# Patient Record
Sex: MALE | Race: WHITE
[De-identification: names, ages, dates, MRNs, and addresses within clinical notes are randomized per-mention and may not be internally consistent; named-entity substitution may affect disease eponyms.]

---

## 2019-01-05 ENCOUNTER — HOSPITAL ENCOUNTER (OUTPATIENT)
Dept: HOSPITAL 25 - ED | Age: 39
Discharge: HOME | End: 2019-01-05
Attending: SURGERY
Payer: COMMERCIAL

## 2019-01-05 VITALS — DIASTOLIC BLOOD PRESSURE: 94 MMHG | SYSTOLIC BLOOD PRESSURE: 143 MMHG

## 2019-01-05 DIAGNOSIS — I10: ICD-10-CM

## 2019-01-05 DIAGNOSIS — R19.7: ICD-10-CM

## 2019-01-05 DIAGNOSIS — R10.31: ICD-10-CM

## 2019-01-05 DIAGNOSIS — F17.210: ICD-10-CM

## 2019-01-05 DIAGNOSIS — R11.0: ICD-10-CM

## 2019-01-05 DIAGNOSIS — K35.80: Primary | ICD-10-CM

## 2019-01-05 LAB
ALBUMIN SERPL BCG-MCNC: 4.4 G/DL (ref 3.2–5.2)
ALBUMIN/GLOB SERPL: 1.5 {RATIO} (ref 1–3)
ALP SERPL-CCNC: 73 U/L (ref 34–104)
ALT SERPL W P-5'-P-CCNC: 55 U/L (ref 7–52)
AMYLASE SERPL-CCNC: 59 U/L (ref 29–103)
ANION GAP SERPL CALC-SCNC: 4 MMOL/L (ref 2–11)
APTT PPP: 32.8 SECONDS (ref 26–36.3)
AST SERPL-CCNC: 22 U/L (ref 13–39)
BASOPHILS # BLD AUTO: 0.1 10^3/UL (ref 0–0.2)
BUN SERPL-MCNC: 12 MG/DL (ref 6–24)
BUN/CREAT SERPL: 10.9 (ref 8–20)
CALCIUM SERPL-MCNC: 9.5 MG/DL (ref 8.6–10.3)
CHLORIDE SERPL-SCNC: 106 MMOL/L (ref 101–111)
EOSINOPHIL # BLD AUTO: 0.2 10^3/UL (ref 0–0.6)
GLOBULIN SER CALC-MCNC: 2.9 G/DL (ref 2–4)
GLUCOSE SERPL-MCNC: 82 MG/DL (ref 70–100)
HCO3 SERPL-SCNC: 27 MMOL/L (ref 22–32)
HCT VFR BLD AUTO: 44 % (ref 42–52)
HGB BLD-MCNC: 14.8 G/DL (ref 14–18)
INR PPP/BLD: 0.9 (ref 0.77–1.02)
LYMPHOCYTES # BLD AUTO: 1.5 10^3/UL (ref 1–4.8)
MAGNESIUM SERPL-MCNC: 2.1 MG/DL (ref 1.9–2.7)
MCH RBC QN AUTO: 30 PG (ref 27–31)
MCHC RBC AUTO-ENTMCNC: 34 G/DL (ref 31–36)
MCV RBC AUTO: 91 FL (ref 80–94)
MONOCYTES # BLD AUTO: 1.5 10^3/UL (ref 0–0.8)
NEUTROPHILS # BLD AUTO: 15.9 10^3/UL (ref 1.5–7.7)
NRBC # BLD AUTO: 0 10^3/UL
NRBC BLD QL AUTO: 0
PLATELET # BLD AUTO: 256 10^3/UL (ref 150–450)
POTASSIUM SERPL-SCNC: 4.3 MMOL/L (ref 3.5–5)
PROT SERPL-MCNC: 7.3 G/DL (ref 6.4–8.9)
RBC # BLD AUTO: 4.87 10^6/UL (ref 4–5.4)
RBC UR QL AUTO: (no result)
SODIUM SERPL-SCNC: 137 MMOL/L (ref 135–145)
WBC # BLD AUTO: 19.3 10^3/UL (ref 3.5–10.8)

## 2019-01-05 PROCEDURE — 36415 COLL VENOUS BLD VENIPUNCTURE: CPT

## 2019-01-05 PROCEDURE — 99285 EMERGENCY DEPT VISIT HI MDM: CPT

## 2019-01-05 PROCEDURE — 74177 CT ABD & PELVIS W/CONTRAST: CPT

## 2019-01-05 PROCEDURE — 86140 C-REACTIVE PROTEIN: CPT

## 2019-01-05 PROCEDURE — 85730 THROMBOPLASTIN TIME PARTIAL: CPT

## 2019-01-05 PROCEDURE — 81015 MICROSCOPIC EXAM OF URINE: CPT

## 2019-01-05 PROCEDURE — 88304 TISSUE EXAM BY PATHOLOGIST: CPT

## 2019-01-05 PROCEDURE — 83605 ASSAY OF LACTIC ACID: CPT

## 2019-01-05 PROCEDURE — 83735 ASSAY OF MAGNESIUM: CPT

## 2019-01-05 PROCEDURE — 80053 COMPREHEN METABOLIC PANEL: CPT

## 2019-01-05 PROCEDURE — 82150 ASSAY OF AMYLASE: CPT

## 2019-01-05 PROCEDURE — C1776 JOINT DEVICE (IMPLANTABLE): HCPCS

## 2019-01-05 PROCEDURE — 85025 COMPLETE CBC W/AUTO DIFF WBC: CPT

## 2019-01-05 PROCEDURE — 85610 PROTHROMBIN TIME: CPT

## 2019-01-05 PROCEDURE — 81003 URINALYSIS AUTO W/O SCOPE: CPT

## 2019-01-05 PROCEDURE — 83690 ASSAY OF LIPASE: CPT

## 2019-01-05 NOTE — ED
Abdominal Pain/Male





- HPI Summary


HPI Summary: 


Patient is a 37 y/o M presenting to ED with complaints of RLQ/right flank pain 

with nausea onset today at 0400/0430. Provider in room at 1020, patient's 

roommate/friend/former peyton Gonzalez accompanying patient. He states that he 

was awakened from sleep at 0400/0430 due to the pain. Pain is described as "

like someone shoved a ball in there and is holding it there". Patient also 

describes pain as a tightness when he is just sitting still, states that when 

he walks/stretches he experiences a sharp pain that radiates across his lower 

abdomen. Patient reports nausea but denies vomiting. In the room, he notes that 

he had diarrhea this morning and was feeling feverish. He states that he was 

experiencing chills last night. Patient believes that he is not dehydrated. He 

had coffee at 0730/0800 this morning, states that last solid food was last 

night. Patient reports no episodes of similar pain. He states that he has never 

had a kidney stone, denies hematuria. Patient denies radiation of pain to 

genitalia. 





He has not taken any medications for the pain. Patient is on Lisinopril for 

HTN. No allergies reported. No PSHx. Patient smokes 1/2-3/4 packs cigarettes 

daily, reports occasional alcohol consumption, no substance usage. Patient 

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 

FMHx of mother with HTN. On triage, pain is rated 7/10, nothing is noted to 

aggravate/alleviate Sx. Home medications and allergies are reviewed. 





 Home Medications





Lisinopril 20 mg PO DAILY 01/05/19 [History Confirmed 01/05/19]





 Allergies











Allergy/AdvReac Type Severity Reaction Status Date / Time


 


No Known Allergies Allergy   Verified 01/05/19 10:09

















- History of Current Complaint


Chief Complaint: EDAbdPain


Stated Complaint: RIGHT SIDE ABD PAIN


Time Seen by Provider: 01/05/19 10:22


Hx Obtained From: Patient, Family/Caretaker - friend Lisa


Onset/Duration: Sudden Onset, Lasting Hours - pain onset 0400/0430, Still 

Present


Timing: Constant, Lasting Hours - pain onset 0400/0430


Severity Initially: Moderate


Severity Currently: Severe - 7/10


Pain Intensity: 7


Pain Scale Used: 0-10 Numeric - 7/10


Location: Discrete At: RLQ, Flank - right


Radiates: Yes


Radiates to: Flank - right


Character: Sharp - when moving/stretching, Other: - tightness when sitting


Aggravating Factor(s): Movement, Other: - stretching


Alleviating Factor(s): Nothing


Associated Signs And Symptoms: Positive: Fever - felt feverish, Nausea, Diarrhea

, Other - chills last night.  Negative: Constipation, Urinary Symptoms, Vomiting





- Allergies/Home Medications


Allergies/Adverse Reactions: 


 Allergies











Allergy/AdvReac Type Severity Reaction Status Date / Time


 


No Known Allergies Allergy   Verified 01/05/19 10:09











Home Medications: 


 Home Medications





Lisinopril 20 mg PO DAILY 01/05/19 [History Confirmed 01/05/19]











PMH/Surg Hx/FS Hx/Imm Hx


Previously Healthy: No


Cardiovascular History: Reports: Hx Hypertension


Sensory History: 


   Denies: Hx Legally Blind, Hx Deafness


Opthamlomology History: 


   Denies: Hx Legally Blind


EENT History: 


   Denies: Hx Deafness





- Surgical History


Surgery Procedure, Year, and Place: 01/05/19 - no surgical history reported


Infectious Disease History: No


Infectious Disease History: 


   Denies: Traveled Outside the [place] in Last 30 Days





- Family History


Known Family History: Positive: Hypertension - mother 





- Social History


Occupation: Employed Full-time


Lives: Dormitory/Roommates


Alcohol Use: Occasionally


Hx Substance Use: No


Smoking Status (MU): Current Every Day Smoker - 1/2 - 3/4 packs daily





Review of Systems


Positive: Fever - patient reports that he felt feverish, temp on vitals noted 

to be 99.4 F , Chills - last night 


Cardiovascular: Negative


Respiratory: Negative


Positive: Abdominal Pain - RLQ , Diarrhea, Nausea, Other - NEGATIVE - 

CONSTIPATION .  Negative: Vomiting


Positive: flank pain - RIGHT.  Negative: hematuria


Musculoskeletal: Negative


Skin: Negative


Neurological: Negative


Psychological: Normal


All Other Systems Reviewed And Are Negative: Yes





Physical Exam





- Summary


Physical Exam Summary: 


Appearance: Well-appearing, moderate pain distress, well-nourished, walking 

slowly, afebrile  


Skin: Warm, color reflects adequate perfusion, dry


Head: Normal Head/Face inspection, atraumatic


Eyes: Conjunctiva clear


ENT: Normal inspection


Neck: Supple, no nodes, no JVD


Respiratory: Lungs clear, normal breath sounds, no respiratory distress


Cardio: RRR, No murmur, pulses normal, brisk capillary refill


Abdomen: Soft, RLQ tenderness with guarding, no rebound, nondistended 


Bowel sounds: Present 


Musculoskeletal: Strength Intact/ROM intact, no calf tenderness, no edema. No 

CVA tenderness


Psychological: Normal


Neuro: Alert, muscle tone normal, no focal deficit








Triage Information Reviewed: Yes


Vital Signs On Initial Exam: 


 Initial Vitals











Temp Pulse Resp BP Pulse Ox


 


 99.4 F   86   15   132/97   99 


 


 01/05/19 10:07  01/05/19 10:07  01/05/19 10:07  01/05/19 10:07  01/05/19 10:07











Vital Signs Reviewed: Yes





Diagnostics





- Vital Signs


 Vital Signs











  Temp Pulse Resp BP Pulse Ox


 


 01/05/19 10:07  99.4 F  86  15  132/97  99














- Laboratory


Result Diagrams: 


 01/05/19 10:54





 01/05/19 10:54


Lab Statement: Any lab studies that have been ordered have been reviewed, and 

results considered in the medical decision making process.





- CT


  ** CT ADB/PEL


CT Interpretation Completed By: Radiologist


Summary of CT Findings: CT ABD/PEL IMPRESSION:  CT findings are consistent with 

early acute appendicitis.  THIS REPORT WAS REVIWED BY ED PHYSICIAN.





Re-Evaluation





- Re-Evaluation


  ** First Eval


Re-Evaluation Time: 11:45


Change: Worse


Comment: Patient states that he has been shivering, rates pain 7-8/10 at 

present. He reports no episodes of vomiting.





  ** Second Eval


Re-Evaluation Time: 12:42


Change: Unchanged


Comment: Patient is agreeable with morphine at this time. Pain is unchanged.





  ** Third Eval


Re-Evaluation Time: 14:03


Change: Improved


Comment: Informed of appendicitis. Patient's pain is controlled, patient 

advised to remain NPO.





  ** Fourth Eval


Re-Evaluation Time: 16:05


Change: Unchanged


Comment: Dr. Jhaveri has not arrived in ED yet, was in the OR when initially 

contacted. He will be contacted once more. This was relayed to patient. Pain 

remains controlled.





Abdominal Pain Fem Course/Dx





- Course


Course Of Treatment: Patient is a 37 y/o M presenting to ED with complaints of 

RLQ/right flank pain with nausea onset today at 0400/0430. Provider in room at 

1020, patient's roommate/friend/former peyton Gonzalez accompanying patient. He 

states that he was awakened from sleep at 0400/0430 due to the pain. Pain is 

described as "like someone shoved a ball in there and is holding it there". 

Patient also describes pain as a tightness when he is just sitting still, 

states that when he walks/stretches he experiences a sharp pain that radiates 

across his lower abdomen. Patient reports nausea but denies vomiting. In the 

room, he notes that he had diarrhea this morning and was feeling feverish. He 

states that he was experiencing chills last night. Patient believes that he is 

not dehydrated. He had coffee at 0730/0800 this morning, states that last solid 

food was last night. Patient reports no episodes of similar pain. He states 

that he has never had a kidney stone, denies hematuria. Patient denies 

radiation of pain to genitalia.  He has not taken any medications for the pain. 

Patient is on Lisinopril for HTN. No PSHx. On physical exam, moderate pain 

distress, patient walks slowly, tenderness at RLQ, soft, + guarding, no 

rebound. Bowel sounds are present, no CVA tenderness.  CT ABD/PEL IMPRESSION:  

CT findings are consistent with early acute appendicitis.  Labs showed WBC 19.3

, absolute neuts 15.9, absolute monos 1.5, ALT 55, AST 22, Alk Phos 73, CRP 11, 

amylase 59, lipase 29, lactic acid 0.9 . UA showed 1+ blood, 3+ RBC, absent 

bacteria, negative glucose.  During ED course, patient received fluids, Zofran 

4 mg IV ED ONCE, Morphine 8 mg IV ED ONCE ONE.  Patient's case was discussed 

with Dr. Jhaveri at 1400, Dr. Jhaveri is in the OR at this time, and will come to 

ED when able, to evaluate patient. Patient to be started on piperacillin sod/

tazobactam sod 3.375 gm in sodium chloride 100 mls @ 200 mls/hr IVPB ED ONCE 

ONE. 1630 - At this point, patient had been evaluate and was accepted by Dr. Jhaveri to OR.  Dx of acute appendicitis.





- Diagnoses


Differential Diagnosis/HQI/PQRI: Appendicitis, Gall Bladder Disease, 

Pancreatitis, Ureteral Stone, Urinary Tract Infection


Provider Diagnoses: 


 Acute appendicitis








- Provider Notifications


Discussed Care Of Patient With: Ancelmo Jhaveri


Time Discussed With Above Provider: 14:00


Instructed by Provider To: Admit As Inpatient - Patient's case was discussed 

with Dr. Jhaveri at 1400, when Dr. Jhaveri was in the OR. Dr. Jhaveri will come to ED 

to evaluate patient. Patient to be started on piperacillin sod/tazobactam sod 

3.375 gm in sodium chloride 100 mls @ 200 mls/hr IVPB ED ONCE ONE. 1630 - At 

this point, patient had been evaluated and was accepted by Dr. Jhaveri to OR.





Discharge





- Sign-Out/Discharge


Documenting (check all that apply): Patient Departure - admit 





- Discharge Plan


Condition: Stable


Disposition: ADMITTED TO [facility]





- Billing Disposition and Condition


Condition: STABLE


Disposition: Admitted to [facility]





- Attestation Statements


Document Initiated by Alo: Yes


Documenting Scribe: ANASTASIA HUANG 


Provider For Whom Alo is Documenting (Include Credential): GAVI MARCUS MD


Scribe Attestation: 


ANASTASIA ARRINGTON , scribed for GAVI MARCUS MD on 01/13/19 at 2134. 


Scribe Documentation Reviewed: Yes


Provider Attestation: 


The documentation as recorded by the ANASTASIA ko  accurately reflects 

the service I personally performed and the decisions made by me, GAVI MARCUS MD


Status of Scribe Document: Viewed

## 2019-01-05 NOTE — BRIEFOPN
Brief Operative Note





- Surgery


Procedures: 





Pre-OP Diagnoses:   acute appendicitis





Post-op Diagnosis:   same





Procedure:      Laparoscopic appendectomy





Surgeon:       Emilio





Asst:       none





Anethesia:       GETA  





EBL:      minimal





IVF:      1600cc crystalloid





Specimen:      appendix





Drains:      none

## 2019-01-05 NOTE — HP
CC:  [facility]; Dr. Yasmin Larose

 

HISTORY AND PHYSICAL:

 

DATE OF ADMISSION:  01/05/19

 

HISTORY OF PRESENT ILLNESS:  Mr. Resendiz is a 38-year-old gentleman who presents to [facility]
 with complaints of right upper quadrant pain since 4 a.m. this morning.  It is accompanied with carlos
sea, but without vomiting.  The patient does not describe any change in bowel habits.  He denies any 
fevers, but does endorse being cold.  He denies any previous similar symptoms.  He has decreased appe
tite. Pain is alleviated with lying still and aggravated with bending over.

 

PAST MEDICAL HISTORY:  Hypertension.

 

PAST SURGICAL HISTORY:  None.

 

MEDICATIONS:  Lisinopril.

 

ALLERGIES:  No known drug allergies.

 

FAMILY HISTORY:  Noncontributory for ulcerative colitis or Crohn disease.

 

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  He smokes half a pack a day of cigarettes.  Israel
es IV drug abuse.  He occasionally drinks alcohol.

 

REVIEW OF SYSTEMS:  No fevers.  No cardiovascular disease.  No cerebrovascular disease.  Abdominal co
mplaints as described.  No irritable bowel symptoms.  No dysuria.  No change in bowel habits.  No cla
udication-type symptoms.  The patient is obese.

 

                               PHYSICAL EXAMINATION

 

GENERAL:  He is alert and oriented x3.  He is in no apparent distress.

 

VITAL SIGNS:  T-max is 100.9, ______ is 100.1.

 

HEAD, EYES, EARS, NOSE, AND THROAT:  Normocephalic, atraumatic.  Sclerae anicteric. Mucous membranes 
are dry.

 

NECK:  No lymphadenopathy.

 

LUNGS:  Clear.

 

ABDOMEN:  Soft, nondistended, tender along the right flank and right lower quadrant.  No peritoneal s
igns.  No hernias or masses noted.  No groin hernias.

 

RECTAL:  Exam not performed.

 

EXTREMITIES:  Within normal limits.

 

 LABORATORY DATA/DIAGNOSTIC STUDIES:  Labs reviewed, white count is 19.3.  Metabolic panel shows a cr
eatinine of 1.1 and a CRP of 11.  Urinalysis shows rbc's.

 

CAT scan reviewed, ______ oral contrast.  The patient has what appears to be a retrocecal appendiciti
s with adjacent inflammatory changes, but no evidence of perforation.

 

IMPRESSION:  Acute appendicitis.  Recommended laparoscopic appendectomy.  I outlined the details of t
he procedure going over the risks, benefits, and alternatives.  Spending time discussing the possible
 alternatives of antibiotic use.  I do not recommend this, but it was discussed as a possibility.

 

We spoke about possible complications, which include but not limited to bleeding, infection, need for
 additional procedures or open procedures, possibility of injury to adjacent organs and the need for 
additional treatment and even transfer.  The patient's questions were answered, consent signed, he wa
s marked.

 

The patient has already received a dose of Zosyn.  We will take him urgently to the operating room fo
r a laparoscopic appendectomy.  He may possibly go home after the procedure depending on what we find
 intraoperatively.

 

 

 

[id number]/[facility] #: [id number]

## 2019-01-06 NOTE — OP
CC:  Dr. Larose; [facility].

 

OPERATIVE REPORT:

 

DATE OF OPERATION:  01/05/19.

 

DATE OF BIRTH:  [birth year].

 

SURGEON:  Ancelmo Jhaveri MD

 

ASSISTANT:  None.

 

ANESTHESIOLOGIST:  Dr. Bonner.

 

ANESTHESIA:  General.

 

PRE-OP DIAGNOSIS:  Acute appendicitis.

 

POST-OP DIAGNOSIS:  Acute appendicitis.

 

OPERATIVE PROCEDURE:  Laparoscopic appendectomy.

 

ESTIMATED BLOOD LOSS:  Minimal.

 

FLUIDS:  1600 cc of crystalloid fluid given.

 

SPECIMEN:  Appendix.

 

DESCRIPTION OF PROCEDURE:  The patient was identified in the preoperative area, marked, brought to [facility] operating room, placed on the operating table in supine position.  The patient already received pre
operative antibiotics in the ER, no additional antibiotics were given.  Sequential devices were place
d on bilateral lower extremities.  General anesthesia was induced.

 

The patient's abdomen was clipped of hair, prepped and draped in the standard surgical fashion.  A ti
me-out was performed.

 

Folds of the umbilicus were elevated anteriorly and a Veress needle was inserted into the abdominal c
avity, which was then allowed to insufflate to a pressure of 15 mmHg.  The patient tolerated the insu
fflation well.  A right upper quadrant incision was made and a 12-mm trocar with Optiview was inserte
d.  Veress needle was removed.  Review of the abdomen showed normal-appearing small bowel.  There was
 no free fluid.  We then placed two 5 mm's along the lower midline and attention was turned towards t
he right lower quadrant.  Staples repositioned and appendix tracking up the lateral aspect of the cec
um was identified, this had a retroperitoneal layer over it.  This was taken with electrocautery and 
with scissors until we could rotate the appendix, which showed some exudative material at the base, b
ut no evidence of perforation until we could rotate this medially. Window was made at the base of the
 appendix and a 45-mm tan MARIO stapling device was fired through this through healthy tissue at the ba
se of the cecum.

 

Additional dissection was carried out with electrocautery, taking the mesoappendix close to the appen
landon until we were at the artery, which was then stapled off with a 45-mm gray MARIO stapling device.  A
ppendix was placed in the endoscopic retrieval bag.

 

Review of the staple line showed no evidence of bleeding or enteric contents.  We used a gauze to dab
 the sites and clean up any blood.  This was minimal and we then put the patient back into neutral po
sition.  Review of the pelvis showed no evidence of additional disease or fluid.  Gauze was removed a
nd then the appendix in its endoscopic retrieval bag was removed through the right upper quadrant por
t site.  The abdomen was allowed to collapse.  Trocars were removed under direct vision.  All 3 skin 
incisions were reapproximated with 4-0 Monocryl subcuticular sutures followed by Steri-Strips and renzo
rile dressing.  The patient tolerated the procedure well, was transferred to the PACU in stable condi
tion.

 

 [id number]/[facility] #: [id number]